# Patient Record
Sex: MALE | Race: WHITE | Employment: UNEMPLOYED | URBAN - METROPOLITAN AREA
[De-identification: names, ages, dates, MRNs, and addresses within clinical notes are randomized per-mention and may not be internally consistent; named-entity substitution may affect disease eponyms.]

---

## 2019-05-27 ENCOUNTER — HOSPITAL ENCOUNTER (EMERGENCY)
Age: 39
Discharge: HOME | End: 2019-05-27
Payer: COMMERCIAL

## 2019-05-27 VITALS
RESPIRATION RATE: 16 BRPM | DIASTOLIC BLOOD PRESSURE: 66 MMHG | HEART RATE: 61 BPM | SYSTOLIC BLOOD PRESSURE: 109 MMHG | HEIGHT: 65 IN | WEIGHT: 147.49 LBS | OXYGEN SATURATION: 96 % | TEMPERATURE: 97.5 F | BODY MASS INDEX: 24.57 KG/M2

## 2019-05-27 DIAGNOSIS — M79.89 RIGHT LEG SWELLING: ICD-10-CM

## 2019-05-27 DIAGNOSIS — M79.661 RIGHT CALF PAIN: Primary | ICD-10-CM

## 2019-05-27 LAB
A/G RATIO: 1.6 (ref 1.1–2.2)
ALBUMIN SERPL-MCNC: 4.2 G/DL (ref 3.4–5)
ALP BLD-CCNC: 63 U/L (ref 40–129)
ALT SERPL-CCNC: 13 U/L (ref 10–40)
ANION GAP SERPL CALCULATED.3IONS-SCNC: 9 MMOL/L (ref 3–16)
APTT: 32.6 SEC (ref 26–36)
AST SERPL-CCNC: 13 U/L (ref 15–37)
BASOPHILS ABSOLUTE: 0.1 K/UL (ref 0–0.2)
BASOPHILS RELATIVE PERCENT: 0.7 %
BILIRUB SERPL-MCNC: 0.5 MG/DL (ref 0–1)
BUN BLDV-MCNC: 12 MG/DL (ref 7–20)
CALCIUM SERPL-MCNC: 8.7 MG/DL (ref 8.3–10.6)
CHLORIDE BLD-SCNC: 106 MMOL/L (ref 99–110)
CO2: 25 MMOL/L (ref 21–32)
CREAT SERPL-MCNC: 0.7 MG/DL (ref 0.9–1.3)
D DIMER: 202 NG/ML DDU (ref 0–229)
EOSINOPHILS ABSOLUTE: 0.1 K/UL (ref 0–0.6)
EOSINOPHILS RELATIVE PERCENT: 0.8 %
GFR AFRICAN AMERICAN: >60
GFR NON-AFRICAN AMERICAN: >60
GLOBULIN: 2.7 G/DL
GLUCOSE BLD-MCNC: 100 MG/DL (ref 70–99)
HCT VFR BLD CALC: 44.5 % (ref 40.5–52.5)
HEMOGLOBIN: 15.3 G/DL (ref 13.5–17.5)
INR BLD: 1.04 (ref 0.86–1.14)
LYMPHOCYTES ABSOLUTE: 2.4 K/UL (ref 1–5.1)
LYMPHOCYTES RELATIVE PERCENT: 26.2 %
MCH RBC QN AUTO: 30.2 PG (ref 26–34)
MCHC RBC AUTO-ENTMCNC: 34.5 G/DL (ref 31–36)
MCV RBC AUTO: 87.4 FL (ref 80–100)
MONOCYTES ABSOLUTE: 0.6 K/UL (ref 0–1.3)
MONOCYTES RELATIVE PERCENT: 6.8 %
NEUTROPHILS ABSOLUTE: 6 K/UL (ref 1.7–7.7)
NEUTROPHILS RELATIVE PERCENT: 65.5 %
PDW BLD-RTO: 13.4 % (ref 12.4–15.4)
PLATELET # BLD: 208 K/UL (ref 135–450)
PMV BLD AUTO: 7.5 FL (ref 5–10.5)
POTASSIUM SERPL-SCNC: 4.5 MMOL/L (ref 3.5–5.1)
PROTHROMBIN TIME: 11.9 SEC (ref 9.8–13)
RBC # BLD: 5.09 M/UL (ref 4.2–5.9)
SODIUM BLD-SCNC: 140 MMOL/L (ref 136–145)
TOTAL PROTEIN: 6.9 G/DL (ref 6.4–8.2)
WBC # BLD: 9.1 K/UL (ref 4–11)

## 2019-05-27 PROCEDURE — 85730 THROMBOPLASTIN TIME PARTIAL: CPT

## 2019-05-27 PROCEDURE — 80053 COMPREHEN METABOLIC PANEL: CPT

## 2019-05-27 PROCEDURE — 99283 EMERGENCY DEPT VISIT LOW MDM: CPT

## 2019-05-27 PROCEDURE — 85610 PROTHROMBIN TIME: CPT

## 2019-05-27 PROCEDURE — 85025 COMPLETE CBC W/AUTO DIFF WBC: CPT

## 2019-05-27 PROCEDURE — 85379 FIBRIN DEGRADATION QUANT: CPT

## 2019-05-27 ASSESSMENT — PAIN DESCRIPTION - ONSET
ONSET: ON-GOING
ONSET: ON-GOING

## 2019-05-27 ASSESSMENT — PAIN SCALES - GENERAL
PAINLEVEL_OUTOF10: 6
PAINLEVEL_OUTOF10: 7
PAINLEVEL_OUTOF10: 5

## 2019-05-27 ASSESSMENT — PAIN DESCRIPTION - FREQUENCY
FREQUENCY: INTERMITTENT
FREQUENCY: INTERMITTENT

## 2019-05-27 ASSESSMENT — PAIN DESCRIPTION - LOCATION: LOCATION: LEG

## 2019-05-27 ASSESSMENT — PAIN DESCRIPTION - DESCRIPTORS
DESCRIPTORS: THROBBING;TIGHTNESS
DESCRIPTORS: DISCOMFORT

## 2019-05-27 ASSESSMENT — PAIN DESCRIPTION - PROGRESSION
CLINICAL_PROGRESSION: NOT CHANGED
CLINICAL_PROGRESSION: NOT CHANGED

## 2019-05-27 ASSESSMENT — PAIN - FUNCTIONAL ASSESSMENT
PAIN_FUNCTIONAL_ASSESSMENT: PREVENTS OR INTERFERES SOME ACTIVE ACTIVITIES AND ADLS
PAIN_FUNCTIONAL_ASSESSMENT: 0-10
PAIN_FUNCTIONAL_ASSESSMENT: PREVENTS OR INTERFERES SOME ACTIVE ACTIVITIES AND ADLS

## 2019-05-27 ASSESSMENT — ENCOUNTER SYMPTOMS
VOMITING: 0
SHORTNESS OF BREATH: 0
NAUSEA: 0

## 2019-05-27 ASSESSMENT — PAIN DESCRIPTION - ORIENTATION
ORIENTATION: RIGHT
ORIENTATION: RIGHT;LOWER

## 2019-05-27 ASSESSMENT — PAIN DESCRIPTION - PAIN TYPE
TYPE: ACUTE PAIN
TYPE: ACUTE PAIN

## 2019-05-27 NOTE — LETTER
[facility] Emergency Department  [facility] [address]  Phone: [phone]               May 27, 2019    Patient: Adriano Masterson   YOB: 1980   Date of Visit: 5/27/2019       To Whom It May Concern:    Adriano Masterson was seen and treated in our emergency department on 5/27/2019. He may return to work on 05/30/2019.       Sincerely,       Jarrett Butterfield RN         Signature:__________________________________

## 2019-05-27 NOTE — ED NOTES
Bed: S-46  Expected date:   Expected time:   Means of arrival:   Comments:  Claire Barbosa RN  05/27/19 6651

## 2019-05-27 NOTE — ED PROVIDER NOTES
[facility]  eMERGENCY dEPARTMENT eNCOUnter      Evaluation by Advanced Practice Provider    Pt Name: Juma Goodman  MRN: [id number]  [place] 1980  Date of evaluation: 5/27/2019  Provider: Chin Richey Dr       Chief Complaint   Patient presents with    Leg Pain     sent fro Urgent Care to get an ultrasound of his right leg. pt c/o pain in right calf and ankle since Saturday, worsening today         HISTORY OF PRESENT ILLNESS  (Location/Symptom, Timing/Onset, Context/Setting, Quality, Duration,Modifying Factors, Severity.)   Juma Goodman is a 41 y.o. male who presents to the emergencydepartment for right lower leg swelling and pain. Patient reports he noticed it yesterday. Reports he has chronic right ankle pain and swelling that is worse when he stands on his feet for long periods of time. Reports he does stand a lot at work. Started with calf pain and swelling yesterday  He's been applying ice and elevating. He has  history of Emily-Danlos. Denies prior episodes of this pain and swelling. Sent here for an urgent care for concern for DVT and an ultrasound. Denies fever chills nausea vomiting chest pain shortness of breath. He denies any history of VTE, long car trips or plane rides past 4 weeks, surgery injury trauma past 12 weeks. Denies hemoptysis hormone  use recent immobilization. Nursing Notes were reviewed and I agree. REVIEW OF SYSTEMS    (2-9 systems for level 4, 10 or more for level 5)     Review of Systems   Constitutional: Negative for chills and fever. Respiratory: Negative for shortness of breath. Cardiovascular: Positive for leg swelling. Negative for chest pain. Gastrointestinal: Negative for nausea and vomiting. Musculoskeletal: Positive for myalgias. Except as noted above the remainder of the review of systems was reviewed and negative.        PAST MEDICAL HISTORY         Diagnosis Date    Emily-Danlos disease        SURGICAL HISTORY   History reviewed. No pertinent surgical history. CURRENT MEDICATIONS       Discharge Medication List as of 5/27/2019  5:55 PM      CONTINUE these medications which have NOT CHANGED    Details   ondansetron (ZOFRAN ODT) 4 MG disintegrating tablet Take 1-2 tablets by mouth every 8 hours as needed for nausea., Disp-10 tablet, R-0             ALLERGIES     Patient has no known allergies. FAMILY HISTORY     History reviewed. No pertinent family history. No family status information on file. SOCIAL HISTORY      reports that he has been smoking. He has been smoking about 0.50 packs per day. He has never used smokeless tobacco. He reports that he does not drink alcohol or use drugs. PHYSICAL EXAM    (up to 7 for level 4, 8 or more for level 5)     ED Triage Vitals [05/27/19 1505]   BP Temp Temp Source Pulse Resp SpO2 Height Weight   116/72 97.5 °F (36.4 °C) Oral (!) 46 14 98 % 5' 5\" (1.651 m) 147 lb 7.8 oz (66.9 kg)       Physical Exam   Constitutional: He is oriented to person, place, and time. He appears well-developed and well-nourished. No distress. HENT:   Head: Normocephalic and atraumatic. Nose: Nose normal.   Eyes: EOM are normal.   Neck: Normal range of motion. Neck supple. Cardiovascular: Normal rate, regular rhythm and normal heart sounds. Pulmonary/Chest: Effort normal and breath sounds normal. No respiratory distress. Musculoskeletal:   Mild TTP of the right calf. No erythema of the calf. Right lower leg is swollen twice the size of the left leg. Ankle is mildly TTP with mild edema (chronic). DP and posterior tibial pulse 2+. Compartments fo the leg are soft. Foot is nontender with no erythema edema. Lifts leg off the bed without difficulty. Neurological: He is alert and oriented to person, place, and time. Skin: Skin is warm and dry. He is not diaphoretic. Psychiatric: He has a normal mood and affect.  His behavior is DDU   Protime-INR   Result Value Ref Range    Protime 11.9 9.8 - 13.0 sec    INR 1.04 0.86 - 1.14   APTT   Result Value Ref Range    aPTT 32.6 26.0 - 36.0 sec       All other labs were within normal range or not returned as of this dictation. EMERGENCY DEPARTMENT COURSE and DIFFERENTIALDIAGNOSIS/MDM:   Vitals:    Vitals:    05/27/19 1505 05/27/19 1702 05/27/19 1731   BP: 116/72 (!) 115/59 109/66   Pulse: (!) 46  61   Resp: 14  16   Temp: 97.5 °F (36.4 °C)     TempSrc: Oral     SpO2: 98% 99% 96%   Weight: 147 lb 7.8 oz (66.9 kg)     Height: 5' 5\" (1.651 m)         Patient was seen and examined by myself. Supervising physician was available for consultation. Patient wasnontoxic, well appearing, afebrile with normal vital signs with the exception of bradycardia 46. He reports this is chronic. Suspect it is his normal.. Saturating well on room air. Denies chest pain and shortness of breath. CBC CMP normal.  ddimer 202. Wells score is 1. Low suspsicon for DVT, however would like him to obtain vascular US to further evaluate. Upon reeval, he clinically appears well. Repeat HR prior to dc was 52 on my reeval.instructed to call [address] [place] [address] tomorrow to schedule ultrasound. Discussed continue to ice and elevated. FU with PCP in next few days for reeval and to return for worsening. He agreed and understood. CONSULTS:  None    PROCEDURES:  None    FINAL IMPRESSION      1. Right calf pain    2.  Right leg swelling        DISPOSITION/PLAN   DISPOSITION Decision To Discharge 05/27/2019 06:08:57 PM      PATIENT REFERRED TO:  Call [address] [place] [address] to schedule venous ultrasound of your calf tomorrow          Riaz Portillo MD  [facility] [address] [facility]  [phone]    Schedule an appointment as soon as possible for a visit in 2 days  for reevaluation    [facility] Emergency Department  [address] [facility]  [phone]    As needed, If